# Patient Record
Sex: FEMALE | Race: WHITE | Employment: OTHER | URBAN - NONMETROPOLITAN AREA
[De-identification: names, ages, dates, MRNs, and addresses within clinical notes are randomized per-mention and may not be internally consistent; named-entity substitution may affect disease eponyms.]

---

## 2017-11-18 ENCOUNTER — HOSPITAL ENCOUNTER (EMERGENCY)
Age: 77
Discharge: HOME | End: 2017-11-18
Attending: NURSE PRACTITIONER
Payer: MEDICARE

## 2017-11-18 VITALS
RESPIRATION RATE: 16 BRPM | OXYGEN SATURATION: 95 % | DIASTOLIC BLOOD PRESSURE: 85 MMHG | HEART RATE: 74 BPM | TEMPERATURE: 98.3 F | SYSTOLIC BLOOD PRESSURE: 156 MMHG

## 2017-11-18 DIAGNOSIS — N76.0 VAGINITIS AND VULVOVAGINITIS: ICD-10-CM

## 2017-11-18 DIAGNOSIS — R30.0 DYSURIA: Primary | ICD-10-CM

## 2017-11-18 DIAGNOSIS — R35.0 URINARY FREQUENCY: ICD-10-CM

## 2017-11-18 PROCEDURE — 87070 CULTURE OTHR SPECIMN AEROBIC: CPT

## 2017-11-18 PROCEDURE — 99213 OFFICE O/P EST LOW 20 MIN: CPT

## 2017-11-18 PROCEDURE — 99203 OFFICE O/P NEW LOW 30 MIN: CPT | Performed by: NURSE PRACTITIONER

## 2017-11-18 PROCEDURE — 87205 SMEAR GRAM STAIN: CPT

## 2017-11-18 RX ORDER — FLUCONAZOLE 100 MG/1
100 TABLET ORAL DAILY
Qty: 7 TABLET | Refills: 0 | Status: SHIPPED | OUTPATIENT
Start: 2017-11-18 | End: 2017-11-25

## 2017-11-18 RX ORDER — CIPROFLOXACIN 500 MG/1
500 TABLET, FILM COATED ORAL 2 TIMES DAILY
Qty: 14 TABLET | Refills: 0 | Status: SHIPPED | OUTPATIENT
Start: 2017-11-18 | End: 2017-11-25

## 2017-11-18 ASSESSMENT — ENCOUNTER SYMPTOMS
NAUSEA: 0
VOMITING: 0
ABDOMINAL PAIN: 1
CONSTIPATION: 0
DIARRHEA: 0

## 2017-11-18 ASSESSMENT — PAIN SCALES - GENERAL: PAINLEVEL_OUTOF10: 9

## 2017-11-18 ASSESSMENT — PAIN DESCRIPTION - LOCATION: LOCATION: VAGINA

## 2017-11-18 NOTE — ED PROVIDER NOTES
[address]  Urgent Care Encounter      CHIEF COMPLAINT       Chief Complaint   Patient presents with    Urinary Tract Infection       Nurses Notes reviewed and I agree except as noted in the HPI. HISTORY OF PRESENT ILLNESS   Patsy Olivera is a 68 y.o. female who presents with a 10 day history of urinary frequency and burning. She said her symptoms started 10 days ago. She did call her GYN office and they have a walk-in area where she dropped off a urine specimen. The provider started her on Bactrim twice a day for 5 days initially. When the culture came back she gave her another 7 days of Bactrim. She still has 4 days left. She has not seen any improvement in her symptoms. She did have some chilling but denies any fever. No nausea no vomiting or any other symptoms. She also tells me she has some chronic vaginal yeast problems. She has been taking probiotics that ran out approximately 2 weeks ago. She has problems with mixed incontinence. She has a prolapse and her provider has been treating her by having her do Kegel exercises and prescribed Premarin cream. She is here today with her . REVIEW OF SYSTEMS     Review of Systems   Constitutional: Positive for chills. Negative for appetite change, fatigue and fever. Gastrointestinal: Positive for abdominal pain (over the bladder). Negative for constipation, diarrhea, nausea and vomiting. Genitourinary: Positive for dysuria, frequency (up frequently during the night), urgency, vaginal discharge (white ) and vaginal pain (burning). Negative for flank pain and pelvic pain. Neurological: Negative for dizziness, weakness and headaches. PAST MEDICAL HISTORY   No past medical history on file. SURGICAL HISTORY     Patient  has no past surgical history on file.     CURRENT MEDICATIONS       Discharge Medication List as of 11/18/2017  9:08 AM      CONTINUE these medications which have NOT CHANGED    Details ATENOLOL PO Take by mouthHistorical Med      SIMVASTATIN PO Take by mouthHistorical Med      Celecoxib (CELEBREX PO) Take by mouthHistorical Med      AMLODIPINE BESYLATE PO Take by mouthHistorical Med             ALLERGIES     Patient is is allergic to pcn [penicillins]. FAMILY HISTORY     Patient's family history is not on file. SOCIAL HISTORY     Patient  reports that she does not drink alcohol. PHYSICAL EXAM     ED TRIAGE VITALS  BP: (!) 156/85, Temp: 98.3 °F (36.8 °C), Pulse: 74, Resp: 16, SpO2: 95 %  Physical Exam   Constitutional: She is oriented to person, place, and time. She appears well-developed and well-nourished. No distress. HENT:   Head: Normocephalic and atraumatic. Neck: Neck supple. Cardiovascular: Normal rate, regular rhythm and normal heart sounds. No murmur heard. Pulmonary/Chest: Effort normal and breath sounds normal.   Abdominal: Soft. Normal appearance. She exhibits no distension and no mass. There is no hepatomegaly. There is tenderness in the suprapubic area. There is no rebound, no guarding and no CVA tenderness. Genitourinary: Uterus normal.       There is no rash, tenderness, lesion or injury on the right labia. There is no rash, tenderness, lesion or injury on the left labia. Cervix exhibits no motion tenderness, no discharge and no friability. Right adnexum displays no mass, no tenderness and no fullness. Left adnexum displays no mass, no tenderness and no fullness. There is erythema (minimal) and tenderness in the vagina. No bleeding in the vagina. Vaginal discharge (white small amount) found. Lymphadenopathy:     She has no cervical adenopathy. Right: No inguinal adenopathy present. Left: No inguinal adenopathy present. Neurological: She is alert and oriented to person, place, and time. Skin: Skin is warm and dry. Psychiatric: She has a normal mood and affect. Her behavior is normal.   Nursing note and vitals reviewed.       DIAGNOSTIC

## 2017-11-21 LAB
GENITAL CULTURE, ROUTINE: NORMAL
GRAM STAIN RESULT: NORMAL

## 2017-12-15 ENCOUNTER — HOSPITAL ENCOUNTER (OUTPATIENT)
Dept: AUDIOLOGY | Age: 77
Discharge: HOME | End: 2017-12-15

## 2017-12-15 PROCEDURE — 92593 HC HEARING AID CHECK, BOTH EARS: CPT | Performed by: AUDIOLOGIST

## 2017-12-15 NOTE — PROGRESS NOTES
HEARING AID CHECK:  Clean and check both hearing aids. Replaced domes and jesse covers, bilaterally. Otoscopic examination was unremarkable, bilaterally. Patient happy with hearing aids. Patient paid $20 for today's services. Will see as needed.

## 2018-03-22 ENCOUNTER — HOSPITAL ENCOUNTER (OUTPATIENT)
Dept: WOMENS IMAGING | Age: 78
Discharge: HOME | End: 2018-03-22
Payer: MEDICARE

## 2018-03-22 DIAGNOSIS — Z12.31 VISIT FOR SCREENING MAMMOGRAM: ICD-10-CM

## 2018-03-22 DIAGNOSIS — Z78.0 POST-MENOPAUSE: ICD-10-CM

## 2018-03-22 PROCEDURE — 77080 DXA BONE DENSITY AXIAL: CPT

## 2018-03-22 PROCEDURE — 77063 BREAST TOMOSYNTHESIS BI: CPT

## 2019-03-25 ENCOUNTER — HOSPITAL ENCOUNTER (OUTPATIENT)
Dept: MAMMOGRAPHY | Age: 79
Discharge: HOME | End: 2019-03-25
Payer: MEDICARE

## 2019-03-25 DIAGNOSIS — Z12.39 BREAST CANCER SCREENING: ICD-10-CM

## 2019-03-25 PROCEDURE — 77063 BREAST TOMOSYNTHESIS BI: CPT

## 2020-10-07 ENCOUNTER — HOSPITAL ENCOUNTER (OUTPATIENT)
Dept: MAMMOGRAPHY | Age: 80
Discharge: HOME | End: 2020-10-07
Payer: MEDICARE

## 2020-10-07 PROCEDURE — 77063 BREAST TOMOSYNTHESIS BI: CPT

## 2021-03-04 ENCOUNTER — HOSPITAL ENCOUNTER (OUTPATIENT)
Dept: AUDIOLOGY | Age: 81
Discharge: HOME | End: 2021-03-04
Payer: COMMERCIAL

## 2021-03-04 PROCEDURE — 92592 HC HEARING AID CHECK, ONE EAR: CPT | Performed by: AUDIOLOGIST

## 2021-03-04 PROCEDURE — S0618 AUDIOMETRY FOR HEARING AID: HCPCS | Performed by: AUDIOLOGIST

## 2021-03-04 NOTE — PROGRESS NOTES
AUDIOLOGICAL EVALUATION      REASON FOR TESTING:  Longstanding bilateral hearing loss and hearing aid user. Patient wishes to pursue new hearing aid technology through her insurance benefits with [employer]. She is currently wearing [facility] 30 ALICIA hearing aids that were fit in 2012. OTOSCOPY: Partially occluding cerumen for both ears. AUDIOGRAM        Reliability: Good  Audiometer Used:  GSI-61    HEARING AID CHECK:   Replaced wax filters, domes and microphone covers on both hearing aids. A listening check of the hearing aids revealed normal output. COMMENTS: Mild to moderately-severe sensorineural hearing loss for both ears. Speech discrimination ability is excellent at 100% for the left ear and good at 84% for the right ear. Thresholds have declined bilaterally relative to previous audiogram from 2009. RECOMMENDATION(S):   1- Recommended Debox drops and flushing due to bilateral cerumen. 2- New binaural amplification is recommended. Discussed hearing aid options with the patient. Reviewed styles and technology. Decided on [employer]o M30 R ALICIA hearing aids (P1 color with #1 receivers). Patient's hearing aid fitting is scheduled for 4/12/2021. 3- Annual audiometry for monitoring purposes or sooner if any changes are noted in hearing ability.

## 2021-04-12 ENCOUNTER — HOSPITAL ENCOUNTER (OUTPATIENT)
Dept: AUDIOLOGY | Age: 81
Discharge: HOME | End: 2021-04-12
Payer: COMMERCIAL

## 2021-04-12 PROCEDURE — V5261 HEARING AID, DIGIT, BIN, BTE: HCPCS | Performed by: AUDIOLOGIST

## 2021-04-12 PROCEDURE — V5160 DISPENSING FEE BINAURAL: HCPCS | Performed by: AUDIOLOGIST

## 2021-04-12 NOTE — PROGRESS NOTES
[facility]#: [id number]   ACCT#: [de-identified]    DIAGNOSIS: Sensorineural hearing loss of both ears. NEW HEARING AID FITTING: Dispensed Phonak [employer] M30-R ALICIA hearing aids for both ears. Explained care, use and insertion/removal.  Programmed. Patient did not wish to have her hearing aids paired to her cell phone. Hearing aid fitting recheck scheduled for 4/26/2021. AIDED TESTING:  Real ear to  measures were obtained during today's appointment. The [employer] Real Ear system was used to perform the RECD measures. The hearing aid was reprogrammed to match appropriate targets for MPO, soft, medium and loud stimuli with speech mapping based on 3/4/2021 audiological data. The measures were as follows. RECD Measures (measured in dBSPL):  RIGHT EAR:  250Hz -12, 500Hz -10, 750Hz -8, 1000Hz 2, 1500Hz 4, 2000Hz 1, 3000Hz 2, 4000Hz 0, 6000 Hz -5. LEFT EAR:  250Hz -13, 500Hz -7, 750Hz -8, 1000Hz -2, 1500Hz 4,  2000Hz 6, 3000Hz 5, 4000Hz 4, 6000 Hz1. Aided responses suggest appropriate outcomes with hearing aids fit to [facility] targets.

## 2021-04-26 ENCOUNTER — HOSPITAL ENCOUNTER (OUTPATIENT)
Dept: AUDIOLOGY | Age: 81
Discharge: HOME | End: 2021-04-26

## 2021-04-26 PROCEDURE — 9990000010 HC NO CHARGE VISIT: Performed by: AUDIOLOGIST

## 2021-04-26 NOTE — PROGRESS NOTES
TWO WEEK CHECK: The patient is doing well with the new hearing aids. Some tinniness. Decreased from 100% target gain to 90% and the patient noted improvement. Review proper cleaning. Sent follow up letter to the patient. Will see Patsy annually, or sooner if problems arise.

## 2021-10-13 ENCOUNTER — HOSPITAL ENCOUNTER (OUTPATIENT)
Dept: MAMMOGRAPHY | Age: 81
Discharge: HOME | End: 2021-10-13
Payer: MEDICARE

## 2021-10-13 DIAGNOSIS — Z12.31 VISIT FOR SCREENING MAMMOGRAM: ICD-10-CM

## 2021-10-13 PROCEDURE — 77067 SCR MAMMO BI INCL CAD: CPT

## 2021-10-13 PROCEDURE — 77063 BREAST TOMOSYNTHESIS BI: CPT

## 2022-01-20 ENCOUNTER — HOSPITAL ENCOUNTER (OUTPATIENT)
Dept: WOMENS IMAGING | Age: 82
Discharge: HOME | End: 2022-01-20
Payer: MEDICARE

## 2022-01-20 DIAGNOSIS — Z78.0 ASYMPTOMATIC MENOPAUSAL STATE: ICD-10-CM

## 2022-01-20 PROCEDURE — 77080 DXA BONE DENSITY AXIAL: CPT

## 2022-12-28 ENCOUNTER — HOSPITAL ENCOUNTER (OUTPATIENT)
Dept: MAMMOGRAPHY | Age: 82
Discharge: HOME | End: 2022-12-28
Payer: MEDICARE

## 2022-12-28 DIAGNOSIS — Z12.31 VISIT FOR SCREENING MAMMOGRAM: ICD-10-CM

## 2022-12-28 PROCEDURE — 77067 SCR MAMMO BI INCL CAD: CPT

## 2023-08-21 ENCOUNTER — HOSPITAL ENCOUNTER (OUTPATIENT)
Dept: AUDIOLOGY | Age: 83
Discharge: HOME | End: 2023-08-21

## 2023-08-21 PROCEDURE — 92592 HC HEARING AID CHECK, ONE EAR: CPT | Performed by: AUDIOLOGIST

## 2023-08-21 NOTE — PROGRESS NOTES
ACCOUNT #: [de-identified]    DIAGNOSIS: Sensorineural hearing loss of both ears. HEARING AID PROBLEM: Patient states that her hearing aids have gradually started to sound hollow. She has not replaced the wax filters or domes. Replaced domes, filters and retention wires on both hearing aids today. Otoscopy revealed possible retracted tympanic membrane, bilaterally. Patient noted improvement in sound quality after the hearing aids were cleaned today. Showed her how to replace the wax filters and domes. Domes were given to the patient. Schedule annual hearing aid check for 5/13/24. Billed $20.00 for today's clean/check.

## 2024-02-15 ENCOUNTER — HOSPITAL ENCOUNTER (OUTPATIENT)
Dept: MAMMOGRAPHY | Age: 84
Discharge: HOME | End: 2024-02-15
Payer: MEDICARE

## 2024-02-15 VITALS — BODY MASS INDEX: 29.45 KG/M2 | WEIGHT: 150 LBS | HEIGHT: 60 IN

## 2024-02-15 DIAGNOSIS — Z12.31 VISIT FOR SCREENING MAMMOGRAM: ICD-10-CM

## 2024-02-15 PROCEDURE — 77063 BREAST TOMOSYNTHESIS BI: CPT

## 2024-05-28 ENCOUNTER — HOSPITAL ENCOUNTER (OUTPATIENT)
Dept: AUDIOLOGY | Age: 84
Discharge: HOME | End: 2024-05-28

## 2024-05-28 PROCEDURE — V5014 HEARING AID REPAIR/MODIFYING: HCPCS | Performed by: AUDIOLOGIST

## 2024-05-28 NOTE — PROGRESS NOTES
ANNUAL HEARING AID CHECK: Otoscopy was WNL for both ears. Patient reports occasional pain for the right ear that she attributes to her sinuses- encouraged her to follow up with PCP if it continues to bother her.  Listening check of hearing aids revealed normal function. Replaced wax filters and domes. Gave patient a one year supply of domes and filters. Billed $20.00 for hearing aid check. Scheduled annual check for 5/29/25.

## 2025-03-25 ENCOUNTER — LAB (OUTPATIENT)
Dept: LAB | Age: 85
End: 2025-03-25

## 2025-03-25 LAB
25(OH)D3 SERPL-MCNC: 33 NG/ML (ref 30–100)
CALCIUM SERPL-MCNC: 10 MG/DL (ref 8.8–10.2)
CRP SERPL-MCNC: < 0.3 MG/DL (ref 0–0.5)
ERYTHROCYTE [SEDIMENTATION RATE] IN BLOOD BY WESTERGREN METHOD: 3 MM/HR (ref 0–20)

## 2025-04-03 ENCOUNTER — HOSPITAL ENCOUNTER (OUTPATIENT)
Dept: MAMMOGRAPHY | Age: 85
Discharge: HOME | End: 2025-04-03
Payer: MEDICARE

## 2025-04-03 DIAGNOSIS — Z12.39 SCREENING BREAST EXAMINATION: ICD-10-CM

## 2025-04-03 PROCEDURE — 77063 BREAST TOMOSYNTHESIS BI: CPT

## 2025-09-04 ENCOUNTER — HOSPITAL ENCOUNTER (OUTPATIENT)
Dept: GENERAL RADIOLOGY | Age: 85
Discharge: HOME | End: 2025-09-04
Payer: MEDICARE

## 2025-09-04 LAB
INR PPP: 2.32 (ref 0.85–1.13)
PROTHROMBIN TIME: 26.9 SECONDS (ref 10–13.5)

## 2025-09-04 PROCEDURE — 85610 PROTHROMBIN TIME: CPT

## 2025-09-04 PROCEDURE — 36415 COLL VENOUS BLD VENIPUNCTURE: CPT
